# Patient Record
Sex: FEMALE | Race: WHITE
[De-identification: names, ages, dates, MRNs, and addresses within clinical notes are randomized per-mention and may not be internally consistent; named-entity substitution may affect disease eponyms.]

---

## 2017-01-07 ENCOUNTER — HOSPITAL ENCOUNTER (EMERGENCY)
Dept: HOSPITAL 25 - ED | Age: 47
Discharge: HOME | End: 2017-01-07
Payer: COMMERCIAL

## 2017-01-07 VITALS — SYSTOLIC BLOOD PRESSURE: 134 MMHG | DIASTOLIC BLOOD PRESSURE: 91 MMHG

## 2017-01-07 DIAGNOSIS — Z87.891: ICD-10-CM

## 2017-01-07 DIAGNOSIS — M43.6: Primary | ICD-10-CM

## 2017-01-07 DIAGNOSIS — Z88.2: ICD-10-CM

## 2017-01-07 PROCEDURE — 99281 EMR DPT VST MAYX REQ PHY/QHP: CPT

## 2017-01-07 NOTE — ED
Kristen SHARPE Claudia, scribed for Samy Wagoner MD on 01/07/17 at 2040 .





Neck Pain





- HPI Summary


HPI Summary: 





46 year old female presents to the ED with c/c of left sided neck pain. Pt 

notes that she has a sudden onset of neck pain 3-4 days ago. Pt states the pain 

started when she woke up one morning. She states that she was seen by [facility] 

yesterday but the pain persists. The prescribed her Naproxen which has not 

alleviated her stiff-neck, she notes that she is still unable to move her head. 





- History of Current Complaint


Chief Complaint: EDNeckComplaint


Stated Complaint: NECK SWELLING


Time Seen by Provider: 01/07/17 20:21


Hx Obtained From: Patient


Mechanism Of Injury: No Known Trauma


Onset/Duration: Sudden Onset, Started days ago - 3-4 days ago after waking up


Pain Intensity: 8


Pain Scale Used: 0-10 Numeric


Location: Diffuse - neck


Character: Stiff


Aggravating Factors: Nothing


Alleviating Factors: Nothing





- Allergies/Home Medications


Allergies/Adverse Reactions: 


 Allergies











Allergy/AdvReac Type Severity Reaction Status Date / Time


 


Adhesive Tape Allergy  Rash And Verified 01/07/17 20:45





   Itching  


 


Omeprazole [From Prilosec] Allergy  Unknown Verified 01/07/17 20:45





   Reaction  





   Details  


 


Sulfa Antibiotics Allergy  Rash Verified 01/07/17 20:45














PMH/Surg Hx/FS Hx/Imm Hx


Previously Healthy: Yes


GI History: Reports: Other GI Disorders - CALCULUS OF GALLBLADDER


Sensory History: Reports: Hx Contacts or Glasses - GLASSES


   Denies: Hx Hearing Aid


Opthamlomology History: Reports: Hx Contacts or Glasses - GLASSES





- Cancer History


Hx Chemotherapy: No


Hx Radiation Therapy: No





- Surgical History


Surgery Procedure, Year, and Place: TUBAL LIGATION 2010 [facility] [place].  LUMBAR DISCECTOMY 

2005  [facility] [place].  DEVIATED SEPTUM 2011  [facility] [place]


Hx Anesthesia Reactions: No


Infectious Disease History: No


Infectious Disease History: 


   Denies: Traveled Outside the [place] in Last 30 Days





- Family History


Family History: No FHx of Breast Cancer





- Social History


Occupation: Unemployed


Lives: With Family


Alcohol Use: Occasionally


Alcohol Amount: glass of wine


Substance Use Type: Reports: None


Smoking Status (MU): Former Smoker


Have You Smoked in the Last Year: No





Review of Systems


Constitutional: Negative


Negative: Fever, Chills


Eyes: Negative


ENT: Negative


Cardiovascular: Negative


Respiratory: Negative


Gastrointestinal: Negative


Genitourinary: Negative


Positive: Other - diffuse neck pain 


Skin: Negative


Neurological: Negative


Psychological: Normal


All Other Systems Reviewed And Are Negative: Yes





Physical Exam


Vital Signs On Initial Exam: 


 Initial Vitals











Temp Pulse Resp BP Pulse Ox


 


 97.3 F   93   17   134/91   99 


 


 01/07/17 20:20  01/07/17 20:20  01/07/17 20:20  01/07/17 20:20  01/07/17 20:20














Diagnostics





- Vital Signs


 Vital Signs











  Temp Pulse Resp BP Pulse Ox


 


 01/07/17 20:20  97.3 F  93  17  134/91  99














- Laboratory


Lab Statement: Any lab studies that have been ordered have been reviewed, and 

results considered in the medical decision making process.





Neck Course/Dx





- Course


Assessment/Plan: 46 year old presents with /c of left sided neck pain. The pain 

starred after she woke up 4 days ago. The pt went to see urgent care and given 

naproxen and flexerol but the pain persisted, it seemed that the pt has acute 

torticollis. Pt was given toradol and decadron for the pain and will be d/c 

with follow-up with PCP. Given Rx for methylprednisolone and will continue 

taking naproxen and flexerol.  I discussed all my findings and test results 

with the patient. Patient understands and agrees. Patient was instructed to 

return to the emergency room immediately if any of the symptoms return or 

worsens. Patient understands and agrees. Plan of care was discussed with the 

patient and patient understands and agrees with the plan of care.  All 

questions were answered at patient satisfaction.  There were no further 

complaints or concerns. Patient was instructed to follow up with primary care 

physician within 3 to 5 days. Patient is hemodynamically stable. Patient is 

alert and oriented x 3. No acute neurological deficits.





- Diagnoses


Provider Diagnoses: 


 Torticollis








Discharge





- Discharge Plan


Condition: Stable


Disposition: HOME


Prescriptions: 


Methylprednisolone [Medrol Dosepak 4 MG*] 4 mg PO .SEE TR INSTRUCTION #1 tr


Patient Education Materials:  Methylprednisolone (By mouth), Neck Pain (ED)


Referrals: 


Carlos Alberto Lackey MD [Primary Care Provider] - 2 Days (Follow-up )





The documentation as recorded by the Kristen stevens Claudia accurately 

reflects the service I personally performed and the decisions made by Ciaran cuevas Walter, MD.

## 2018-05-05 ENCOUNTER — HOSPITAL ENCOUNTER (OUTPATIENT)
Dept: HOSPITAL 25 - ED | Age: 48
Setting detail: OBSERVATION
Discharge: HOME | End: 2018-05-05
Attending: INTERNAL MEDICINE | Admitting: HOSPITALIST
Payer: COMMERCIAL

## 2018-05-05 VITALS — SYSTOLIC BLOOD PRESSURE: 121 MMHG | DIASTOLIC BLOOD PRESSURE: 67 MMHG

## 2018-05-05 DIAGNOSIS — N83.202: ICD-10-CM

## 2018-05-05 DIAGNOSIS — Z88.8: ICD-10-CM

## 2018-05-05 DIAGNOSIS — Z88.2: ICD-10-CM

## 2018-05-05 DIAGNOSIS — N13.2: Primary | ICD-10-CM

## 2018-05-05 DIAGNOSIS — N39.0: ICD-10-CM

## 2018-05-05 DIAGNOSIS — R10.9: ICD-10-CM

## 2018-05-05 LAB
BASOPHILS # BLD AUTO: 0.1 10^3/UL (ref 0–0.2)
EOSINOPHIL # BLD AUTO: 0 10^3/UL (ref 0–0.6)
HCT VFR BLD AUTO: 39 % (ref 35–47)
HGB BLD-MCNC: 12.9 G/DL (ref 12–16)
INR PPP/BLD: 0.97 (ref 0.77–1.02)
LYMPHOCYTES # BLD AUTO: 1.2 10^3/UL (ref 1–4.8)
MCH RBC QN AUTO: 26 PG (ref 27–31)
MCHC RBC AUTO-ENTMCNC: 33 G/DL (ref 31–36)
MCV RBC AUTO: 78 FL (ref 80–97)
MONOCYTES # BLD AUTO: 0.9 10^3/UL (ref 0–0.8)
NEUTROPHILS # BLD AUTO: 9.5 10^3/UL (ref 1.5–7.7)
NRBC # BLD AUTO: 0 10^3/UL
NRBC BLD QL AUTO: 0
PLATELET # BLD AUTO: 252 10^3/UL (ref 150–450)
RBC # BLD AUTO: 5.02 10^6/UL (ref 4–5.4)
WBC # BLD AUTO: 11.6 10^3/UL (ref 3.5–10.8)

## 2018-05-05 PROCEDURE — C1876 STENT, NON-COA/NON-COV W/DEL: HCPCS

## 2018-05-05 PROCEDURE — 36415 COLL VENOUS BLD VENIPUNCTURE: CPT

## 2018-05-05 PROCEDURE — 74176 CT ABD & PELVIS W/O CONTRAST: CPT

## 2018-05-05 PROCEDURE — 83690 ASSAY OF LIPASE: CPT

## 2018-05-05 PROCEDURE — 87186 SC STD MICRODIL/AGAR DIL: CPT

## 2018-05-05 PROCEDURE — 85025 COMPLETE CBC W/AUTO DIFF WBC: CPT

## 2018-05-05 PROCEDURE — BT14ZZZ FLUOROSCOPY OF KIDNEYS, URETERS AND BLADDER: ICD-10-PCS | Performed by: UROLOGY

## 2018-05-05 PROCEDURE — 74018 RADEX ABDOMEN 1 VIEW: CPT

## 2018-05-05 PROCEDURE — 85610 PROTHROMBIN TIME: CPT

## 2018-05-05 PROCEDURE — 74420 UROGRAPHY RTRGR +-KUB: CPT

## 2018-05-05 PROCEDURE — 85730 THROMBOPLASTIN TIME PARTIAL: CPT

## 2018-05-05 PROCEDURE — 80053 COMPREHEN METABOLIC PANEL: CPT

## 2018-05-05 PROCEDURE — 96376 TX/PRO/DX INJ SAME DRUG ADON: CPT

## 2018-05-05 PROCEDURE — 81003 URINALYSIS AUTO W/O SCOPE: CPT

## 2018-05-05 PROCEDURE — 87077 CULTURE AEROBIC IDENTIFY: CPT

## 2018-05-05 PROCEDURE — 99284 EMERGENCY DEPT VISIT MOD MDM: CPT

## 2018-05-05 PROCEDURE — 96374 THER/PROPH/DIAG INJ IV PUSH: CPT

## 2018-05-05 PROCEDURE — 81015 MICROSCOPIC EXAM OF URINE: CPT

## 2018-05-05 PROCEDURE — 96375 TX/PRO/DX INJ NEW DRUG ADDON: CPT

## 2018-05-05 PROCEDURE — 87086 URINE CULTURE/COLONY COUNT: CPT

## 2018-05-05 PROCEDURE — 83605 ASSAY OF LACTIC ACID: CPT

## 2018-05-05 PROCEDURE — G0378 HOSPITAL OBSERVATION PER HR: HCPCS

## 2018-05-05 PROCEDURE — 86140 C-REACTIVE PROTEIN: CPT

## 2018-05-05 NOTE — RAD
INDICATION:  Status post bilateral ureteral stent placement.



COMPARISON:  Comparison is made with a prior CT of the abdomen and pelvis from May 05,

2018.



TECHNIQUE: Frontal supine films of the abdomen were obtained.



FINDINGS: The small bowel and colon appear nondistended. 



There are bilateral ureteral stents which demonstrate normal course. There is a calculus

present on the right side in the region of the ureteropelvic junction adjacent to the

proximal stent measuring 0.6 cm in size. There is a large calculus on the left side which

was noted to be in the renal pelvis on the prior CT study measuring 2.3 x 1.7 cm in size.

In addition there is a calculus which projects over the lower pole of the left kidney

measuring 0.8 cm in size.



Multiple surgical clips are noted right upper quadrant consistent with prior

cholecystectomy.



IMPRESSION:  STATUS POST BILATERAL URETERAL STENT PLACEMENT AND BILATERAL CALCULI AS

NOTED.

## 2018-05-05 NOTE — OP
CC:  Dr. Ace Lackey

 

OPERATIVE REPORT:

 

DATE OF OPERATION:  05/05/18

 

DATE OF BIRTH:  [birth year]

 

SURGEON:  Swapnil Brown MD

 

ANESTHESIOLOGIST:  Dr. Regis Agarwal.

 

ANESTHESIA:  General.

 

PRE-OP DIAGNOSES:

1.  Right renal colic.

2.  Bilateral renal calculi.

3.  Urinary tract infection.

 

POST-OP DIAGNOSES:

1.  Proximal right ureteral calculus (1 cm).

2.  Right hydronephrosis due to above.

3.  2.5-cm non-obstructing calculus in the left renal pelvis.

4.  Urinary tract infection.

 

OPERATIVE PROCEDURE:

1.  Cystoscopy.

2.  Bilateral retrograde pyelographies.

3.  Placement of bilateral ureteral stents (6-Cameroonian).

 

INDICATION FOR PROCEDURE:  Ms. Dominique is a 48-year-old  healthy white female, 
who presented to the emergency room early this morning with symptoms of right 
renal colic.  She had some chills, but no fever.  Urine analysis was positive 
for infection.  Noncontrast CT of the abdomen and pelvis showed a 1 cm calculus 
at the right ureteropelvic junction with associated right hydronephrosis and 
there was a 2.5 cm nonobstructing calculus in the left renal pelvis and another 
1 cm calculus in the lower pole calyx of the left kidney.

 

The patient did not have any fever or elevated white count.  After urine 
cultures were obtained, she was given 1 dose of IV Levaquin.  She is now being 
taken to the operating room for bilateral ureteral stents placement.

 

PATHOLOGY AT CYSTOSCOPY:  The bladder mucosa looked normal.  There was minimal 
degree of hyperemia of the bladder wall.  The ureteral orifices looked normal. 
There were no suspicious bladder lesions seen.

 

At fluoroscopy, a 1 cm radiopaque calculus was noted at the level of L3-L4 in 
the proximal right ureter.  Right retrograde pyelography showed moderate right 
hydronephrosis.  There was slightly cloudy urine noted from the right kidney.

 

Fluoroscopy showed a 2.5 cm radiopaque calculus in the area of the left renal 
pelvis.  Left retrograde pyelography showed no evidence of left ureteropelvic 
junction obstruction.  There was prominence of the left renal pelvis, but the 
calyces were not dilated.  There was slight cloudy urine noted from the left 
kidney.

 

DESCRIPTION OF PROCEDURE:  After successful general anesthesia, the patient was 
placed in the lithotomy position and was prepped and draped for a cystoscopy. 
Cystoscopy was performed.  The bladder was inspected and the above findings 
were noted.

 

A flexible-tip guidewire was then introduced into the right ureteral orifice 
and positioned past the stone inside the renal pelvis.  A size 5-Cameroonian open-
ended catheter was then fed over the guidewire and positioned in the renal 
pelvis. Hydronephrotic drip was noted and after it slowed down a total of 3 cc 
of non- diluted contrast were injected delineating the right renal pelvis and 
collecting system.

 

A size 6-Cameroonian stent was then placed with the proximal  end coiling in the 
upper pole infundibulum and the distal end coiling inside the bladder achieving 
good drainage of contrast from the kidney.

 

Attention was then directed to the left side.  An open-ended catheter was fed 
over a guidewire and positioned in the proximal ureter distal to the 
ureteropelvic junction.  Contrast was injected delineating the ureteropelvic 
junction and the left collecting system and showing no ureteropelvic junction 
obstruction and no significant dilatation of the collecting system.  The 
guidewire was then introduced in the upper pole calyx past the calculus.  A 
size 6-Cameroonian stent was then placed with the proximal end coiling in the upper 
pole infundibulum and the distal end coiling inside the bladder.

 

There was prompt drainage for contrast from both kidneys and no extravasation.

 

The patient tolerated the procedure well and left the operating room in good 
condition.

 

The plan is to bring the patient back for a right ureteroscopy and laser 
lithotripsy of the right renal calculus.

 

She will need percutaneous nephrolithotomy treatment of the left renal calculus.

 

 [id number]/CPS #: [id number]

[facility]

## 2018-05-05 NOTE — ED
Michael SHARPE Nikita, scribed for Yung Alexander MD on 05/05/18 at 0228 .





Abdominal Pain/Female





- HPI Summary


HPI Summary: 


This patient is a 48 year old F presenting to ED with a chief complaint of R 

flank pain and RLQ pain since 2000. The CC is described as aching. The patient 

rates the pain 8/10 in severity. Symptoms aggravated by nothing (no pain when 

she urinates). Symptoms alleviated by a hot bath (took Tylenol to no relief). 

Patient reports nausea and chills. Patient denies vomiting and fever.





- History of Current Complaint


Chief Complaint: EDFlankPain


Stated Complaint: FLANK PAIN


Time Seen by Provider: 05/05/18 02:07


Hx Obtained From: Patient


Onset/Duration: Sudden Onset, Lasting Minutes, Still Present


Timing: Hours


Severity Initially: Moderate


Severity Currently: Moderate


Pain Intensity: 8


Pain Scale Used: 0-10 Numeric


Location: Discrete At: RLQ, Flank - right


Character: Other: - aching


Aggravating Factor(s): Nothing - no pain during urination


Alleviating Factor(s): Other: - hot bath; took Tylenol to no relief


Associated Signs and Symptoms: Positive: Other: - Patient reports nausea and 

chills. Patient denies vomiting and fever.


Allergies/Adverse Reactions: 


 Allergies











Allergy/AdvReac Type Severity Reaction Status Date / Time


 


Adhesive Tape Allergy  Rash And Verified 05/05/18 02:07





   Itching  


 


codeine Allergy  Fatigue Verified 05/05/18 02:07


 


omeprazole [From Prilosec] Allergy  Unknown Verified 05/05/18 02:07





   Reaction  





   Details  


 


Sulfa (Sulfonamide Allergy  Rash Verified 05/05/18 02:07





Antibiotics)     











Home Medications: 


 Home Medications





NK [No Home Medications Reported]  05/05/18 [History Confirmed 05/05/18]











PMH/Surg Hx/FS Hx/Imm Hx


GI History: Reports: Other GI Disorders - CALCULUS OF GALLBLADDER


Musculoskeletal History: Reports: Other Musculoskeletal History - herniated disk


Sensory History: Reports: Hx Contacts or Glasses - GLASSES


   Denies: Hx Hearing Aid


Opthamlomology History: Reports: Hx Contacts or Glasses - GLASSES





- Cancer History


Hx Chemotherapy: No


Hx Radiation Therapy: No





- Surgical History


Surgery Procedure, Year, and Place: T[place]AL [place] 2010 [facility] [place]  LUMBAR DISCECTOMY 

2005  [facility] [place].  DEVIATED SEPTUM 2011  [facility] [place]


Hx Anesthesia Reactions: No





- Immunization History


Date of Tetanus Vaccine: unk


Date of Influenza Vaccine: fall 2017


Infectious Disease History: No


Infectious Disease History: 


   Denies: Traveled Outside the [place] in Last 30 Days





- Family History


Known Family History: Positive: Other


Family History: No FHx of Breast Cancer; Father - P.U.D.; Mother - 

cholecystectomy





- Social History


Alcohol Use: Occasionally


Alcohol Amount: glass of wine


Substance Use Type: Reports: None


Smoking Status (MU): Former Smoker


Have You Smoked in the Last Year: No





Review of Systems


Positive: Chills.  Negative: Fever


Positive: Abdominal Pain - R flank and RLQ pain, Diarrhea.  Negative: Vomiting


All Other Systems Reviewed And Are Negative: Yes





Physical Exam





- Summary


Physical Exam Summary: 


VITAL SIGNS: Reviewed.


GENERAL: ~Patient is a morbidly obese FEMALE who is lying comfortable in the 

stretcher. Patient is not in any acute respiratory distress.


HEAD AND FACE: No signs of trauma. No ecchymosis, hematomas or skull 

depressions. No sinus tenderness.


EYES: PERRLA, EOMI x 2, No injected conjunctiva, no nystagmus.


EARS: Hearing grossly intact. Ear canals and tympanic membranes are within 

normal limits.


MOUTH: Oropharynx within normal limits.


NECK: Supple, trachea is midline, no adenopathy, no JVD, no carotid bruit, no c-

spine tenderness, neck with full ROM.


CHEST: Symmetric, no tenderness at palpation


LUNGS: Clear to auscultation bilaterally. No wheezing or crackles.


CVS: Regular rate and rhythm, S1 and S2 present, no murmurs or gallops 

appreciated.


ABDOMEN: Soft, R CVA tenderness, RLQ tenderness. No signs of distention. No 

rebound, no guarding, and no masses palpated. Bowel sounds are normal.


EXTREMITIES: FROM in all major joints, no edema, no cyanosis or clubbing.


NEURO: Alert and oriented x 3. No acute neurological deficits. Speech is normal 

and follows commands.


SKIN: Dry and warm


Triage Information Reviewed: Yes


Vital Signs On Initial Exam: 


 Initial Vitals











Temp Pulse Resp BP Pulse Ox


 


 97.8 F   84   16   170/87   98 


 


 05/05/18 01:47  05/05/18 01:47  05/05/18 01:47  05/05/18 01:47  05/05/18 01:47











Vital Signs Reviewed: Yes





Diagnostics





- Vital Signs


 Vital Signs











  Temp Pulse Resp BP Pulse Ox


 


 05/05/18 01:47  97.8 F  84  16  170/87  98














- Laboratory


Lab Results: 


 Lab Results











  05/05/18 Range/Units





  02:10 


 


WBC  11.6 H  (3.5-10.8)  10^3/ul


 


RBC  5.02  (4.0-5.4)  10^6/ul


 


Hgb  12.9  (12.0-16.0)  g/dl


 


Hct  39  (35-47)  %


 


MCV  78 L  (80-97)  fL


 


MCH  26 L  (27-31)  pg


 


MCHC  33  (31-36)  g/dl


 


RDW  14  (10.5-15)  %


 


Plt Count  252  (150-450)  10^3/ul


 


MPV  7.1 L  (7.4-10.4)  um3


 


Neut % (Auto)  81.4  (38-83)  %


 


Lymph % (Auto)  10.0 L  (25-47)  %


 


Mono % (Auto)  7.8 H  (0-7)  %


 


Eos % (Auto)  0.2  (0-6)  %


 


Baso % (Auto)  0.6  (0-2)  %


 


Absolute Neuts (auto)  9.5 H  (1.5-7.7)  10^3/ul


 


Absolute Lymphs (auto)  1.2  (1.0-4.8)  10^3/ul


 


Absolute Monos (auto)  0.9 H  (0-0.8)  10^3/ul


 


Absolute Eos (auto)  0  (0-0.6)  10^3/ul


 


Absolute Basos (auto)  0.1  (0-0.2)  10^3/ul


 


Absolute Nucleated RBC  0  10^3/ul


 


Nucleated RBC %  0  











Result Diagrams: 


 05/05/18 02:10





 05/05/18 02:10


Lab Statement: Any lab studies that have been ordered have been reviewed, and 

results considered in the medical decision making process.





- CT


  ** CT abd/pel


CT Interpretation Completed By: Radiologist - 9 mm stone proximal right ureter 

causing moderate hydronephrosis. 2.3 cm stone in left renal pelvis and 8 mm 

stone left lower pole. Minimal left hydronephrosis. 5.0 cm left ovarian cyst, 

advise ultrasound correlation. Unremarkable pancreas. Cholecystectomy. No bowel 

obstruction, colitis, free fluid or free air. Normal appendix. ED physician has 

reviewed this imaging report.





Re-Evaluation





- Re-Evaluation


  ** First Eval


Re-Evaluation Time: 04:37


Comment: Discussed results with the patient.





Abdominal Pain Fem Course/Dx





- Course


Course Of Treatment: This patient is a 48 year old F presenting to ED with a 

chief complaint of R flank pain and RLQ pain since 2000. CT abd/pel reveals 9 

mm stone proximal right ureter causing moderate hydronephrosis. 2.3 cm stone in 

left renal pelvis and 8 mm stone left lower pole. Minimal left hydronephrosis. 

5.0 cm left ovarian cyst, advise ultrasound correlation. Unremarkable pancreas. 

Cholecystectomy. No bowel obstruction, colitis, free fluid or free air. Normal 

appendix. In the ED course, the patient was given Toradol, Reglan, morphine, 

and fluids. Reviewed CT scan and bloodwork with the patient. Patient does not 

have any fever in the ED. Patient stated she took Tylenol last time at 8pm last 

night and had no fever after taking it. She has no vomiting, her BP is normal, 

no HTN, and does not have signs of urosepsis. Urine is consistent with UTI. 

Patient will be admitted to Dr. Truong to have urology consult as inpatient, 

given the large size and location of the stone. The patient is agreeable with 

this plan.





- Diagnoses


Differential Diagnosis: Positive: Urinary Tract Infection, Other - right uretal 

stone


Provider Diagnoses: 


 Right ureteral stone, UTI (urinary tract infection)








- Provider Notifications


Discussed Care Of Patient With: Mallory Truong


Time Discussed With Above Provider: 04:50


Instructed by Provider To: Other - Consulted Dr. Truong who accepts the patient 

for admission.





Discharge





- Sign-Out/Discharge


Documenting (check all that apply): Discharge/Admit/Transfer





- Discharge Plan


Condition: Stable


Disposition: TRANSFER TO OB ([facility])





The documentation as recorded by the Michael stevens Nikita accurately reflects the 

service I personally performed and the decisions made by me, Yung Alexander MD.

## 2018-05-05 NOTE — RAD
INDICATION:  Right flank abdominal pain.



COMPARISON:  There are no prior studies available for comparison.



TECHNIQUE: A CT scan of the abdomen and pelvis was performed without intravenous or oral

contrast. Contiguous axial sections were obtained from the lung bases through the

symphysis pubis. Images were reconstructed in the coronal and sagittal planes.



FINDINGS:  The lung bases are clear.  No pleural effusion is present.



The liver and spleen are normal in size without significant focal abnormality on this

noncontrast study. The patient is status post cholecystectomy. The pancreas appears to be

within normal limits.



The adrenal glands appear to be within normal limits. There is a 0.9 x 0.6 cm calculus

present at the right ureteropelvic junction causing moderate to severe hydronephrosis. In

addition there is a large 2.1 x 1.6 cm calculus in the left renal pelvis causing mild

hydronephrosis. There is also a 0.8 cm calculus in the lower pole of the left kidney. No

bladder calculi or bladder wall thickening is seen. 



The aorta is normal in caliber without significant calcific plaque.



No significant enlarged retroperitoneal lymph nodes are seen.



The stomach, small and large bowel appear nondistended.  The appendix is within normal

limits.  There are a few scattered diverticuli within the colon. There is no evidence for

diverticulitis or colitis.



The uterus is anteverted and normal in size. There is a 4.3 cm left ovarian cyst. No free

intraperitoneal air or fluid is seen.



No significant focal osseous abnormality is seen.



IMPRESSION:  

1. THERE IS A 0.9 CM CALCULUS PRESENT AT THE RIGHT URETEROPELVIC JUNCTION CAUSING MODERATE

TO SEVERE HYDRONEPHROSIS.

2. THERE IS A 2.1 CM CALCULUS IN THE LEFT RENAL PELVIS CAUSING MILD HYDRONEPHROSIS. THERE

IS AN ADDITIONAL CALCULUS IN THE LOWER POLE OF THE LEFT KIDNEY.

3. THERE IS A 4.3 CM LEFT OVARIAN CYST. RECOMMEND FOLLOW-UP OUTPATIENT PELVIC ULTRASOUND

IMAGING FOR FURTHER EVALUATION.

4. STATUS POST CHOLECYSTECTOMY.

## 2018-05-05 NOTE — HP
CC: Dr. Lackey.

 

HISTORY AND PHYSICAL:

 

DATE OF ADMISSION:  05/05/18.

 

PRIMARY CARE PROVIDER:  Dr. Lackey.

 

CHIEF COMPLAINT:  Right flank pain.

 

HISTORY OF PRESENT ILLNESS:  Ms. Dominique is a 48-year-old female who had been in her usual health [facility] approximately 7:30 to 8 p.m. on the night prior to admission when she suddenly developed right-side
d flank pain.  The patient states the pain does radiate down into the groin.  She denies any fevers, 
but states that she did have chills on the evening prior to admission.  She states she took a hot bat
h, tried to relieve to them, which helped some.  She denies any dysuria.  She denies any hematuria.  
She states that she has been nauseous, but not vomiting.  She has never had a kidney stone in the pas
t.

 

PAST MEDICAL HISTORY:  Obesity.

 

PAST SURGICAL HISTORY:

1.  Cholecystectomy.

2.  Lumbar laminectomy.

3.  Nasal septoplasty.

4.  Tubal ligation.

2.  Appendectomy.

 

ALLERGIES:  SULFA, PRILOSEC, CODEINE, and TAPE.

 

FAMILY HISTORY:  Mom is living.  She is in her 70s, has hypertension.  Dad is living, he is also in h
is 70s and healthy.

 

SOCIAL HISTORY:  The patient is a nonsmoker.  She drinks alcohol on occasion.  She did have one rum a
nd coke the night prior to admission.  She works as a seamstress. She is .  She has three chil
dren.  Her  is healthcare proxy.

 

REVIEW OF SYSTEMS:  Complete 11-systems review of systems is obtained.  Pertinent positives and negat
meli are as per HPI and in addition the patient does state over the last three weeks, she is fluctuat
ed between diarrhea and constipation and she has had her period over the last three weeks.  Otherwise
, her review of systems is negative.

 

                               PHYSICAL EXAMINATION

 

GENERAL:  The patient is a well-developed obese middle-aged female, seen lying flat in the stretcher,
 in no acute distress.

 

VITAL SIGNS:  Blood pressure 120/78, pulse 83, respirations 16, temp 97.8, O2 sat 96% on room air.

 

HEENT:  Pupils are equal and round.  Extraocular muscles are intact.  Oropharynx is clear.  Oral muco
sa is moist.  There is no submandibular, cervical, or supraclavicular adenopathy.  Thyroid is not enl
arged.  No thyroid nodules are noted.

 

PULMONARY:  Lungs are clear to auscultation anteriorly.

 

CARDIAC:  Normal S1, S2.  Regular rate and rhythm.  I do not appreciate any murmurs.  There is no low
er extremity edema.

 

ABDOMEN:  Bowel sounds are present.  Abdomen is soft, nontender, nondistended.  She does state that s
he has to urinate and feels lot of pressure when I palpate.

 

MUSCULOSKELETAL:  There is no cyanosis or clubbing of the digits.  There is full active range of abby
on of all 4 extremities.

 

NEUROLOGIC:  Cranial nerves II through XII are grossly intact.  Sensation is intact to light touch th
roughout.  Strength is 5/5 and symmetric in both upper and lower extremities bilaterally.

 

SKIN:  Warm and dry.  There are no rashes.

 

PSYCH:  The patient is alert.  She is oriented x3.  Affect appears appropriate.

 

 DIAGNOSTIC STUDIES/LAB DATA:  WBC 11.6, hemoglobin 12.9, hematocrit 39, platelets 252.  Sodium 137, 
potassium 4.1, chloride 102, CO2 of 30, BUN 14, creatinine 0.90, glucose 133, lactic acid 1.3, calciu
m 9.2, bilirubin 0.4.  AST 17, ALT 20, alk phos 84, CRP 19.77, albumin 4.0, lipase is 25.

 

Urinalysis reveals specific gravity of 1.017 with 2+ blood, positive nitrites, 3+ leukocyte esterase,
 3+ wbc's, 3+ rbc's, and 3+ bacteria.

 

CT abdomen and pelvis reveals a 9 mm stone in the proximal right ureter causing moderate hydronephros
is.  There is a 2.3 cm stone in the left renal pelvis and an 8 mm stone in the left lower pole.  Ther
e is minimal left hydronephrosis.  There is a 5 cm left ovarian cyst.  Ultrasound correlation is advi
sed.

 

ASSESSMENT AND PLAN:  Ms. Dominique is a 48-year-old female with no significant past medical history, wh
o presents to the emergency room with complaints of sudden onset of severe right flank pain with asso
ciated nausea and was found to have a right ureteral stone and large stone within the left renal pelv
is.

 

1.  Right ureteral stone.  This is 9 mm in size.  This is unlikely to pass on its own.  Her pain now 
is under much better control after receiving Toradol and Flomax in the emergency room.  Given the siz
e of a stone and the abnormal urinalysis, plan would be to admit the patient and consult Urology when
 they are on-call in the next 2 hours.  The patient would likely need a stent for this.  She has rece
ived a dose of IV Levaquin in the emergency room and this will be continued.  The patient will contin
ue with normal saline at 125 mL per hour p.r.n., antiemetics and p.r.n., pain medications.  Vital sig
ns will need to be monitored closely to ensure she is not going into sepsis.

2.  Left renal pelvis stone.  This is a very large stone.  This likely will not be intervened upon on
 today.  She will need urology followup for this.

3.  Left ovarian cyst.  Pelvic/transvaginal ultrasound is recommended to evaluate the cyst.  I will n
ot order that at this time as she needs to have her stone dealt with first, but this will need to be 
followed up.

4.  DVT prophylaxis.  According to the Adult Thrombosis Prophylaxis Risk Factor Assessment Guide, the
 patient has a total risk factor score of 2 making her moderate risk.  Ambulation will be utilized as
 DVT prophylaxis for now.

3.  Code status is full.

 

TIME SPENT:  Fifty minutes was spent admitting this patient.

 

 

 

[id number]/[facility] #: [id number]

## 2018-05-05 NOTE — RAD
INDICATION:  Bilateral stones, ureteral stent placement.



COMPARISON:  Correlation is made with a prior CT of the abdomen and pelvis from May 05,

2018.



TECHNIQUE: 15 seconds of intermittent fluoroscopic guidance were provided and 6 spot films

of the abdomen were obtained.



FINDINGS: There is opacification of both proximal collecting systems and placement of

bilateral ureteral stent catheters. 



IMPRESSION:  INTRAOPERATIVE CONTROL FILMS. 



CPT II Codes:

## 2018-05-06 NOTE — DS
CC:  Dr. Lackey; Dr. Brown *

 

DISCHARGE SUMMARY:

 

DATE OF ADMISSION:  05/05/18

 

DATE OF DISCHARGE:  05/05/18

 

PRIMARY CARE PROVIDER:  Dr. Lackey

 

MY ATTENDING WHILE IN THE HOSPITAL:  Kimberly Howell MD * (DICTATED BY EDIS JAIME)

 

CONSULTING UROLOGIST:  Swapnil Brown MD

 

PRIMARY DISCHARGE DIAGNOSES:

1.  Bilateral kidney stones.

2.  Right obstructing ureteral stone.

 

SECONDARY DISCHARGE DIAGNOSIS:  Obesity.

 

STUDIES DONE WHILE IN THE HOSPITAL:  Abdomen and pelvis CT from 05/05/18 read 
as there is a 0.9 cm calculus in the right ureteropelvic junction causing 
moderate-to- severe hydronephrosis.  There is a 2.1 cm calculus in the left 
renal pelvis causing mild hydronephrosis.  There is an additional calculus in 
the lower pole of the left kidney with a 4.3 cm left ovarian cyst, recommended 
followup outpatient pelvic ultrasound imaging for further evaluation, status 
post cholecystectomy. Abdomen x- ray from 05/05/18 read as status post 
bilateral ureteral stent placement and bilateral calculi as noted.

 

MEDICATIONS AT DISCHARGE:

1.  Ciprofloxacin 500 mg p.o. b.i.d. x 14.

2.  Zofran 4 mg p.o. q.4 hours as needed.

 

HOSPITAL COURSE:  This is a brief summary of the patient's presentation.  For 
more details, please see the history and physical from Dr. Mallory Truong on 05/
05/18. In brief, the patient is a 48-year-old female with a past medical 
history significant for the above, who presented with right flank pain, which 
began approximately 8 p.m. the night prior to admission.  It radiated down to 
her groin. She had no fevers, but did have some chills.  There is no palliating 
factor for the pain.  She had no dysuria, hematuria.  She was given Toradol and 
Flomax, which helped with her pain.  She was admitted to the hospital, received 
IV Levaquin and was seen in consultation by Dr. Swapnil Brown of Urology.  
Patient had no signs of sepsis.  No vital sign abnormalities.  No fevers while 
she was in the hospital. Patient had only 2 recorded heart rates above 100 in 
the postoperative period. Patient was taken to the operating room and had 
bilateral renal stents placed. Patient had a urinalysis suspicious for urinary 
tract infection.  Patient had bilateral renal stents placed, which she 
tolerated very well.  The patient had abdominal discomfort and slight dysuria.  
After the procedure, patient had no fevers, tachycardia, hypotension.  Patient 
was urinating well.  Patient was stable and amenable for discharge on 05/05/18 
in the evening.

 

PHYSICAL EXAM ON DATE OF DISCHARGE:  General:  The patient is a 48-year-old 
female who appears stated age, sitting comfortably in bed, in no acute 
distress.  Vital signs at the time of discharge:  Temperature 98.2, pulse rate 
84, respiratory rate 18, oxygen saturation 98% on room air, blood pressure 121/
67.  HEENT:  Head: Normocephalic, atraumatic, sclerae anicteric.  No 
conjunctival injection.  Nasal mucosa moist.  Oral mucosa moist.  No pharyngeal 
erythema, discharge or exudate. Neck:  Supple, nontender.  No lymphadenopathy.  
No carotid bruit auscultated.  No JVD.  Cardiac:  Regular rate and rhythm.  No 
clicks, murmurs, gallops or rubs. Pulses are 2+ in the bilateral dorsalis pedis
, posterior tibialis and radial areas. No lower extremity edema noted.  
Respiratory:  Clear to auscultation bilaterally. No wheezes, rales or rhonchi.  
Good air exchange bilaterally.  Skin: Clean, dry, intact.  No rash.  Abdomen:  
Soft, nontender, nondistended.  Genitourinary: No suprapubic or CVA tenderness.
  Neuro:  Cranial nerves II through XII intact.  No focal deficits.  Alert and 
oriented x3.  Psychiatric:  Pleasant and cooperative.

 

LABORATORY DATA:  On day of discharge:  White blood cell count 11.6, hemoglobin 
12.9, platelet count 252.  INR 0.97,  APTT 31.0.  Sodium 137, potassium 4.1, 
chloride 102, carbon dioxide 30, anion gap 5, BUN 14, creatinine 0.9, glucose 
133, lactic acid 1.3, calcium 9.2.  Total bilirubin 0.4, AST 17, ALT 20, 
alkaline phosphatase 84, CRP 19.77, protein 7.8, lipase 25, albumin 4.0, 
globulin 3.8. Urine:  Yellow, cloudy, 2+ protein, 2+ blood, positive nitrite, 
positive leukocyte esterase, 3+ bacteria.

 

DISCHARGE PLAN:  The patient will be discharged to home.  Patient will be 
treated for her urinary tract infection with Cipro for 1 week.  Patient should 
follow up closely with her urologist, Dr. Brown.  If patient is not called 
before or on Monday with an appointment, patient should call the office with 
the number provided.  Patient will need to follow up for her kidney stones with 
percutaneous lithotomy as well as laser lithotripsy.  Patient will also need 
followup with a pelvic ultrasound for her ovarian cyst through her primary care 
provider.  The patient should engage in activity as tolerated and have a 
regular unrestricted diet.

 

TIME SPENT:  Approximately 60 minutes was spent on this discharge, 30 of which 
was spent face-to-face with the patient obtaining history and physical and 
discussing treatment plan.

 

____________________________________ EDIS JAIME

 

[id number]/[facility] #: [id number]

[place]

## 2018-05-15 NOTE — HP
CC:  Dr. Ace Lackey

 

HISTORY AND PHYSICAL:

 

DATE OF PLANNED ADMISSION AND SURGERY:  05/16/18

 

HISTORY OF PRESENT ILLNESS:  Ms. Dominique is a 48-year-old white female who is 
admitted with a proximal right ureteral calculus for cystoscopy, right 
ureteroscopy, laser lithotripsy and right ureteral stent exchange.

 

Ms. Dominique presented to the emergency room about 10 days ago with symptoms of 
right renal colic and was noted on noncontrast CT of the abdomen and pelvis to 
have a 1- cm calculus at the right ureteropelvic junction associated with right 
hydronephrosis.  She also had a 2.5 cm calculus in the left renal pelvis 
associated with only mild left hydronephrosis. She was asymptomatic from her 
left kidney.

 

The patient was taken to the operating room on 05/05/18 and had a cystoscopy 
and placement of bilateral ureteral stents.  Postoperative KUB showed the right 
ureteral calculus in the proximal ureter just distal to the ureteropelvic 
junction. The left renal calculus was still in the same position.

 

The patient did well following the procedure.  She is now admitted for 
treatment of the right ureteral calculus.

 

PAST MEDICAL HISTORY AND SYSTEM REVIEW:  She is in very good health.  She had a 
cholecystectomy in the past.  She was noted on the CT of the abdomen to have a 
4.5 cm left ovarian cyst.  She denies any cardiac or pulmonary diseases or 
symptoms.

 

ALLERGIES:  She reports being allergic to SULFA and having intolerance to 
CODEINE and omeprazole, sensitivity to adhesive tape.

 

                               PHYSICAL EXAMINATION

 

GENERAL:  Moderately overweight, otherwise healthy-looking white female.

 

VITAL SIGNS:  Blood pressure 120/80, pulse of 80.

 

LUNGS:  Clear.

 

HEART:  Regular and rhythmic.  No murmurs.

 

ABDOMEN:  Soft.  No masses, no tenderness and no CVA tenderness.

 

 IMPRESSION:

1.  History of right renal colic, status post placement of right ureteral stent 
with a 1 cm calculus in the proximal right ureter distal to the ureteropelvic 
junction.

2.  2.5 cm radiopaque calculus in the left renal pelvis, status post placement 
of left ureteral stent.

 

PLAN:  Plan is for right ureteroscopy, laser lithotripsy, and right ureteral 
stent exchange. If the stone is not reachable or migrates into the collecting 
system, she will be a candidate for SWL at a later date.

 

After the Rt ureteral calculus is treated, the patient will be referred to 
[facility] for percutaneous lithotripsy of the left renal calculus.

 

I discussed the above plans in detail with the patient.  All her questions were 
answered.

 

 

 

[id number]/[facility] #: [id number]

CAMRON

## 2018-05-16 ENCOUNTER — HOSPITAL ENCOUNTER (OUTPATIENT)
Dept: HOSPITAL 25 - OR | Age: 48
Discharge: HOME | End: 2018-05-16
Attending: UROLOGY
Payer: COMMERCIAL

## 2018-05-16 VITALS — SYSTOLIC BLOOD PRESSURE: 126 MMHG | DIASTOLIC BLOOD PRESSURE: 69 MMHG

## 2018-05-16 DIAGNOSIS — N20.0: Primary | ICD-10-CM

## 2018-05-16 PROCEDURE — 82365 CALCULUS SPECTROSCOPY: CPT

## 2018-05-16 PROCEDURE — 88300 SURGICAL PATH GROSS: CPT

## 2018-05-16 PROCEDURE — 74420 UROGRAPHY RTRGR +-KUB: CPT

## 2018-05-16 PROCEDURE — C1876 STENT, NON-COA/NON-COV W/DEL: HCPCS

## 2018-05-16 NOTE — RAD
INDICATION: Right ureteral stent placement



COMPARISONS: May 05, 2018



TECHNIQUE: Fluoroscopy was provided for a retrograde pyelogram and stent placement. Total

fluoroscopy time is: 8 seconds



FINDINGS: Spot images demonstrate contrast within the renal collecting system. A ureteral

stent is noted.



IMPRESSION: FLUOROSCOPY WAS PROVIDED FOR A RETROGRADE PYELOGRAM AND STENT PLACEMENT



CPT II Codes:

## 2018-05-17 NOTE — OP
CC:  Dr. Ace Lackey. *

 

DATE OF OPERATION:  05/16/18 - [facility]

 

DATE OF BIRTH:  [birth year]

 

SURGEON:  Swapnil Brown MD

 

ANESTHESIOLOGIST:  Dr. Attila Mccullough.

 

ANESTHESIA:  General.

 

PRE-OP DIAGNOSES:

1.  Right renal calculus.

2.  Status post placement, right ureteral stent.

 

POST-OP DIAGNOSES:

1.  Right renal calculus.

2.  Status post placement, right ureteral stent.

 

OPERATIVE PROCEDURE:

1.  Cystoscopy.

2.  Right ureteroscopy and pyeloscopy.

3.  Laser lithotripsy of right renal calculus.

4.  Right retrograde pyelography and right ureteral stent exchange (6-Israeli).

 

INDICATIONS FOR THE PROCEDURE:  Ms. Dominique is a 48-year-old white female who 
presented about 10 days ago with symptoms of right renal colic and was noted to 
have a 1 cm calculus at the right ureteropelvic junction.  There was also a 2.5 
cm calculus in the left renal pelvis associated with minimal degree of 
hydronephrosis.  The left kidney was totally asymptomatic.  At that visit, the 
patient had urgent placement of bilateral ureteral stents.  Postoperative KUB 
showed that the right renal calculus was just distal to the ureteropelvic 
junction.  The patient is now admitted for definitive treatment of the right 
renal calculus.

 

Pathology:  at fluoroscopy at the beginning of the case, the right ureteral 
stent was in good position.  The calculus had migrated into the right renal 
pelvis.  Upon right ureteroscopy, no calculus was seen in the ureter.  A 1 cm 
calculus that had the gross appearance of a calcium stone was noted in the 
renal pelvis.

 

DESCRIPTION OF PROCEDURE:  After successful general anesthesia, the patient was 
placed in the lithotomy position and was prepped and draped for a cystoscopy. 
Cystoscopy was performed.  Stents were seen coming from the right and the left 
ureteral orifices.  The distal limb of the right ureteral stent was pulled out 
to the level of the ureteral meatus. A flexible tip guidewire was introduced 
through the lumen of the stent and positioned inside the renal pelvis with the 
proximal end coiling in the renal pelvis.  A size 6.5 semirigid ureteroscope 
was then introduced inside the bladder.  A flexible tip basket was introduced 
through the port of the ureteroscope and its flexible tip was then introduced 
into the right ureter alongside the guidewire.  The ureter was nicely dilated 
from the presence of the stent and the ureteroscope was introduced with no 
difficulty all the way to inside the renal pelvis.  The calculus was noted in 
the renal pelvis.  The basket was deployed and the stone was engaged in the 
basket and positioned at the level of the ureteropelvic junction.

 

With the stone stabilized with the basket, a 550 micron laser fiber was 
introduced through the other port of the ureteroscope, and the stone was broken 
into multiple fragments with the laser.  The larger fragments were extracted 
with the basket and sent for stone analysis.

 

Final inspection showed gravel in the renal pelvis, but no significant stone 
fragments noted.  The proximal ureter and the ureteropelvic junction showed 
some hyperemia from the presence of the stone, but the ureteral wall was 
intact.  The ureteroscope was then removed keeping the guidewire in place.  
Fluoroscopy showed no residual right renal stone fragments.

 

Retrograde pyelography was then performed demonstrating the collecting system 
and the proximal ureter and showing no extravasation.

 

A size 6-Israeli stent was placed with the proximal end coiling in the renal 
pelvis at the distal end coiling inside the bladder.  There was good drainage 
of contrast from the kidney and no extravasation.

 

The patient tolerated the procedure well and left the operating room in good 
condition.

 

The plan is to keep the stent in place for about 9 days.  It will be removed in 
the office under local anesthesia.  Arrangements are being made to refer the 
patient to [facility] for percutaneous nephrolithotomy for 
treatment of the large left renal calculus.

 

 [id number]/[facility] #: [id number]

MTDD